# Patient Record
Sex: FEMALE | Race: WHITE | NOT HISPANIC OR LATINO | URBAN - METROPOLITAN AREA
[De-identification: names, ages, dates, MRNs, and addresses within clinical notes are randomized per-mention and may not be internally consistent; named-entity substitution may affect disease eponyms.]

---

## 2024-11-06 ENCOUNTER — INPATIENT (INPATIENT)
Facility: HOSPITAL | Age: 34
LOS: 1 days | Discharge: HOME | DRG: 951 | End: 2024-11-08
Attending: OBSTETRICS & GYNECOLOGY | Admitting: OBSTETRICS & GYNECOLOGY
Payer: COMMERCIAL

## 2024-11-06 VITALS
RESPIRATION RATE: 18 BRPM | SYSTOLIC BLOOD PRESSURE: 140 MMHG | DIASTOLIC BLOOD PRESSURE: 88 MMHG | TEMPERATURE: 98 F | HEART RATE: 72 BPM

## 2024-11-06 DIAGNOSIS — Z34.80 ENCOUNTER FOR SUPERVISION OF OTHER NORMAL PREGNANCY, UNSPECIFIED TRIMESTER: ICD-10-CM

## 2024-11-06 DIAGNOSIS — O26.899 OTHER SPECIFIED PREGNANCY RELATED CONDITIONS, UNSPECIFIED TRIMESTER: ICD-10-CM

## 2024-11-06 DIAGNOSIS — Z98.890 OTHER SPECIFIED POSTPROCEDURAL STATES: Chronic | ICD-10-CM

## 2024-11-06 LAB
ALBUMIN SERPL ELPH-MCNC: 3 G/DL — LOW (ref 3.3–5)
ALP SERPL-CCNC: 150 U/L — HIGH (ref 40–120)
ALT FLD-CCNC: 25 U/L — SIGNIFICANT CHANGE UP (ref 10–45)
ANION GAP SERPL CALC-SCNC: 9 MMOL/L — SIGNIFICANT CHANGE UP (ref 5–17)
AST SERPL-CCNC: 58 U/L — HIGH (ref 10–40)
BASOPHILS # BLD AUTO: 0.03 K/UL — SIGNIFICANT CHANGE UP (ref 0–0.2)
BASOPHILS # BLD AUTO: 0.04 K/UL — SIGNIFICANT CHANGE UP (ref 0–0.2)
BASOPHILS NFR BLD AUTO: 0.2 % — SIGNIFICANT CHANGE UP (ref 0–2)
BASOPHILS NFR BLD AUTO: 0.3 % — SIGNIFICANT CHANGE UP (ref 0–2)
BILIRUB SERPL-MCNC: <0.1 MG/DL — LOW (ref 0.2–1.2)
BLD GP AB SCN SERPL QL: NEGATIVE — SIGNIFICANT CHANGE UP
BUN SERPL-MCNC: 11 MG/DL — SIGNIFICANT CHANGE UP (ref 7–23)
CALCIUM SERPL-MCNC: 9.3 MG/DL — SIGNIFICANT CHANGE UP (ref 8.4–10.5)
CHLORIDE SERPL-SCNC: 107 MMOL/L — SIGNIFICANT CHANGE UP (ref 96–108)
CO2 SERPL-SCNC: 22 MMOL/L — SIGNIFICANT CHANGE UP (ref 22–31)
CREAT SERPL-MCNC: 0.67 MG/DL — SIGNIFICANT CHANGE UP (ref 0.5–1.3)
EGFR: 118 ML/MIN/1.73M2 — SIGNIFICANT CHANGE UP
EOSINOPHIL # BLD AUTO: 0 K/UL — SIGNIFICANT CHANGE UP (ref 0–0.5)
EOSINOPHIL # BLD AUTO: 0.08 K/UL — SIGNIFICANT CHANGE UP (ref 0–0.5)
EOSINOPHIL NFR BLD AUTO: 0 % — SIGNIFICANT CHANGE UP (ref 0–6)
EOSINOPHIL NFR BLD AUTO: 0.7 % — SIGNIFICANT CHANGE UP (ref 0–6)
GLUCOSE SERPL-MCNC: 107 MG/DL — HIGH (ref 70–99)
HCT VFR BLD CALC: 36.9 % — SIGNIFICANT CHANGE UP (ref 34.5–45)
HCT VFR BLD CALC: 37.8 % — SIGNIFICANT CHANGE UP (ref 34.5–45)
HGB BLD-MCNC: 12.4 G/DL — SIGNIFICANT CHANGE UP (ref 11.5–15.5)
HGB BLD-MCNC: 12.5 G/DL — SIGNIFICANT CHANGE UP (ref 11.5–15.5)
IMM GRANULOCYTES NFR BLD AUTO: 0.7 % — SIGNIFICANT CHANGE UP (ref 0–0.9)
IMM GRANULOCYTES NFR BLD AUTO: 0.8 % — SIGNIFICANT CHANGE UP (ref 0–0.9)
LDH SERPL L TO P-CCNC: 232 U/L — SIGNIFICANT CHANGE UP (ref 50–242)
LYMPHOCYTES # BLD AUTO: 0.95 K/UL — LOW (ref 1–3.3)
LYMPHOCYTES # BLD AUTO: 1.47 K/UL — SIGNIFICANT CHANGE UP (ref 1–3.3)
LYMPHOCYTES # BLD AUTO: 13.7 % — SIGNIFICANT CHANGE UP (ref 13–44)
LYMPHOCYTES # BLD AUTO: 4.5 % — LOW (ref 13–44)
MAGNESIUM SERPL-MCNC: 5.7 MG/DL — HIGH (ref 1.6–2.6)
MAGNESIUM SERPL-MCNC: 6.1 MG/DL — HIGH (ref 1.6–2.6)
MCHC RBC-ENTMCNC: 29.3 PG — SIGNIFICANT CHANGE UP (ref 27–34)
MCHC RBC-ENTMCNC: 30.1 PG — SIGNIFICANT CHANGE UP (ref 27–34)
MCHC RBC-ENTMCNC: 33.1 G/DL — SIGNIFICANT CHANGE UP (ref 32–36)
MCHC RBC-ENTMCNC: 33.6 G/DL — SIGNIFICANT CHANGE UP (ref 32–36)
MCV RBC AUTO: 88.7 FL — SIGNIFICANT CHANGE UP (ref 80–100)
MCV RBC AUTO: 89.6 FL — SIGNIFICANT CHANGE UP (ref 80–100)
MONOCYTES # BLD AUTO: 0.68 K/UL — SIGNIFICANT CHANGE UP (ref 0–0.9)
MONOCYTES # BLD AUTO: 0.72 K/UL — SIGNIFICANT CHANGE UP (ref 0–0.9)
MONOCYTES NFR BLD AUTO: 3.2 % — SIGNIFICANT CHANGE UP (ref 2–14)
MONOCYTES NFR BLD AUTO: 6.7 % — SIGNIFICANT CHANGE UP (ref 2–14)
NEUTROPHILS # BLD AUTO: 19.32 K/UL — HIGH (ref 1.8–7.4)
NEUTROPHILS # BLD AUTO: 8.38 K/UL — HIGH (ref 1.8–7.4)
NEUTROPHILS NFR BLD AUTO: 77.9 % — HIGH (ref 43–77)
NEUTROPHILS NFR BLD AUTO: 91.3 % — HIGH (ref 43–77)
NRBC # BLD: 0 /100 WBCS — SIGNIFICANT CHANGE UP (ref 0–0)
NRBC # BLD: 0 /100 WBCS — SIGNIFICANT CHANGE UP (ref 0–0)
PLATELET # BLD AUTO: 189 K/UL — SIGNIFICANT CHANGE UP (ref 150–400)
PLATELET # BLD AUTO: 209 K/UL — SIGNIFICANT CHANGE UP (ref 150–400)
POTASSIUM SERPL-MCNC: 5.2 MMOL/L — SIGNIFICANT CHANGE UP (ref 3.5–5.3)
POTASSIUM SERPL-SCNC: 5.2 MMOL/L — SIGNIFICANT CHANGE UP (ref 3.5–5.3)
PROT SERPL-MCNC: 5.9 G/DL — LOW (ref 6–8.3)
RBC # BLD: 4.12 M/UL — SIGNIFICANT CHANGE UP (ref 3.8–5.2)
RBC # BLD: 4.26 M/UL — SIGNIFICANT CHANGE UP (ref 3.8–5.2)
RBC # FLD: 12.8 % — SIGNIFICANT CHANGE UP (ref 10.3–14.5)
RBC # FLD: 13 % — SIGNIFICANT CHANGE UP (ref 10.3–14.5)
RH IG SCN BLD-IMP: POSITIVE — SIGNIFICANT CHANGE UP
SODIUM SERPL-SCNC: 138 MMOL/L — SIGNIFICANT CHANGE UP (ref 135–145)
T PALLIDUM AB TITR SER: NEGATIVE — SIGNIFICANT CHANGE UP
URATE SERPL-MCNC: 4.9 MG/DL — SIGNIFICANT CHANGE UP (ref 2.5–7)
WBC # BLD: 10.76 K/UL — HIGH (ref 3.8–10.5)
WBC # BLD: 21.15 K/UL — HIGH (ref 3.8–10.5)
WBC # FLD AUTO: 10.76 K/UL — HIGH (ref 3.8–10.5)
WBC # FLD AUTO: 21.15 K/UL — HIGH (ref 3.8–10.5)

## 2024-11-06 RX ORDER — ESCITALOPRAM 10 MG/1
5 TABLET, FILM COATED ORAL DAILY
Refills: 0 | Status: DISCONTINUED | OUTPATIENT
Start: 2024-11-06 | End: 2024-11-08

## 2024-11-06 RX ORDER — MAGNESIUM SULFATE IN 0.9% NACL 2 G/50 ML
4 INTRAVENOUS SOLUTION, PIGGYBACK (ML) INTRAVENOUS ONCE
Refills: 0 | Status: DISCONTINUED | OUTPATIENT
Start: 2024-11-06 | End: 2024-11-06

## 2024-11-06 RX ORDER — MAGNESIUM SULFATE IN 0.9% NACL 2 G/50 ML
4 INTRAVENOUS SOLUTION, PIGGYBACK (ML) INTRAVENOUS ONCE
Refills: 0 | Status: COMPLETED | OUTPATIENT
Start: 2024-11-06 | End: 2024-11-06

## 2024-11-06 RX ORDER — CITRIC ACID/SODIUM CITRATE 334-500MG
15 SOLUTION, ORAL ORAL EVERY 6 HOURS
Refills: 0 | Status: DISCONTINUED | OUTPATIENT
Start: 2024-11-06 | End: 2024-11-06

## 2024-11-06 RX ORDER — MAGNESIUM HYDROXIDE 1200 MG/15ML
30 SUSPENSION ORAL
Refills: 0 | Status: DISCONTINUED | OUTPATIENT
Start: 2024-11-06 | End: 2024-11-08

## 2024-11-06 RX ORDER — MORPHINE SULFATE 30 MG/1
2 TABLET, EXTENDED RELEASE ORAL ONCE
Refills: 0 | Status: DISCONTINUED | OUTPATIENT
Start: 2024-11-06 | End: 2024-11-07

## 2024-11-06 RX ORDER — DEXTROAMPHETAMINE SACCHARATE, AMPHETAMINE ASPARTATE, DEXTROAMPHETAMINE SULFATE, AND AMPHETAMINE SULFATE 3.125; 3.125; 3.125; 3.125 MG/1; MG/1; MG/1; MG/1
5 TABLET ORAL
Refills: 0 | Status: DISCONTINUED | OUTPATIENT
Start: 2024-11-06 | End: 2024-11-06

## 2024-11-06 RX ORDER — MAGNESIUM SULFATE IN 0.9% NACL 2 G/50 ML
2 INTRAVENOUS SOLUTION, PIGGYBACK (ML) INTRAVENOUS
Qty: 40 | Refills: 0 | Status: DISCONTINUED | OUTPATIENT
Start: 2024-11-06 | End: 2024-11-06

## 2024-11-06 RX ORDER — DEXAMETHASONE 1.5 MG 1.5 MG/1
4 TABLET ORAL EVERY 6 HOURS
Refills: 0 | Status: DISCONTINUED | OUTPATIENT
Start: 2024-11-06 | End: 2024-11-07

## 2024-11-06 RX ORDER — HEPARIN SODIUM 10000 [USP'U]/ML
5000 INJECTION INTRAVENOUS; SUBCUTANEOUS EVERY 12 HOURS
Refills: 0 | Status: DISCONTINUED | OUTPATIENT
Start: 2024-11-06 | End: 2024-11-08

## 2024-11-06 RX ORDER — OXYCODONE HYDROCHLORIDE 30 MG/1
5 TABLET ORAL
Refills: 0 | Status: COMPLETED | OUTPATIENT
Start: 2024-11-06 | End: 2024-11-13

## 2024-11-06 RX ORDER — OXYTOCIN IN D5W-0.2% SODIUM CL 15/250 ML
42 PLASTIC BAG, INJECTION (ML) INTRAVENOUS
Qty: 30 | Refills: 0 | Status: DISCONTINUED | OUTPATIENT
Start: 2024-11-06 | End: 2024-11-08

## 2024-11-06 RX ORDER — IBUPROFEN 200 MG
600 TABLET ORAL EVERY 6 HOURS
Refills: 0 | Status: COMPLETED | OUTPATIENT
Start: 2024-11-06 | End: 2025-10-05

## 2024-11-06 RX ORDER — MAGNESIUM SULFATE IN 0.9% NACL 2 G/50 ML
2 INTRAVENOUS SOLUTION, PIGGYBACK (ML) INTRAVENOUS
Qty: 40 | Refills: 0 | Status: DISCONTINUED | OUTPATIENT
Start: 2024-11-06 | End: 2024-11-07

## 2024-11-06 RX ORDER — NALBUPHINE HCL 100 %
2.5 POWDER (GRAM) MISCELLANEOUS EVERY 6 HOURS
Refills: 0 | Status: DISCONTINUED | OUTPATIENT
Start: 2024-11-06 | End: 2024-11-07

## 2024-11-06 RX ORDER — CHLORHEXIDINE GLUCONATE 40 MG/ML
1 SOLUTION TOPICAL DAILY
Refills: 0 | Status: DISCONTINUED | OUTPATIENT
Start: 2024-11-06 | End: 2024-11-06

## 2024-11-06 RX ORDER — AMPICILLIN 2 G/1
2 INJECTION, POWDER, FOR SOLUTION INTRAVENOUS ONCE
Refills: 0 | Status: DISCONTINUED | OUTPATIENT
Start: 2024-11-06 | End: 2024-11-06

## 2024-11-06 RX ORDER — MODIFIED LANOLIN
1 OINTMENT (GRAM) TOPICAL EVERY 6 HOURS
Refills: 0 | Status: DISCONTINUED | OUTPATIENT
Start: 2024-11-06 | End: 2024-11-08

## 2024-11-06 RX ORDER — NALOXONE HYDROCHLORIDE 1 MG/ML
0.1 INJECTION, SOLUTION INTRAMUSCULAR; INTRAVENOUS; SUBCUTANEOUS
Refills: 0 | Status: DISCONTINUED | OUTPATIENT
Start: 2024-11-06 | End: 2024-11-07

## 2024-11-06 RX ORDER — DIPHENHYDRAMINE HCL 12.5MG/5ML
25 ELIXIR ORAL EVERY 6 HOURS
Refills: 0 | Status: DISCONTINUED | OUTPATIENT
Start: 2024-11-06 | End: 2024-11-08

## 2024-11-06 RX ORDER — ONDANSETRON HYDROCHLORIDE 2 MG/ML
4 INJECTION, SOLUTION INTRAMUSCULAR; INTRAVENOUS EVERY 6 HOURS
Refills: 0 | Status: DISCONTINUED | OUTPATIENT
Start: 2024-11-06 | End: 2024-11-07

## 2024-11-06 RX ORDER — CLOSTRIDIUM TETANI TOXOID ANTIGEN (FORMALDEHYDE INACTIVATED), CORYNEBACTERIUM DIPHTHERIAE TOXOID ANTIGEN (FORMALDEHYDE INACTIVATED), BORDETELLA PERTUSSIS TOXOID ANTIGEN (GLUTARALDEHYDE INACTIVATED), BORDETELLA PERTUSSIS FILAMENTOUS HEMAGGLUTININ ANTIGEN (FORMALDEHYDE INACTIVATED), BORDETELLA PERTUSSIS PERTACTIN ANTIGEN, AND BORDETELLA PERTUSSIS FIMBRIAE 2/3 ANTIGEN 5; 2; 2.5; 5; 3; 5 [LF]/.5ML; [LF]/.5ML; UG/.5ML; UG/.5ML; UG/.5ML; UG/.5ML
0.5 INJECTION, SUSPENSION INTRAMUSCULAR ONCE
Refills: 0 | Status: DISCONTINUED | OUTPATIENT
Start: 2024-11-06 | End: 2024-11-08

## 2024-11-06 RX ORDER — OXYTOCIN IN D5W-0.2% SODIUM CL 15/250 ML
167 PLASTIC BAG, INJECTION (ML) INTRAVENOUS
Qty: 30 | Refills: 0 | Status: DISCONTINUED | OUTPATIENT
Start: 2024-11-06 | End: 2024-11-06

## 2024-11-06 RX ORDER — SIMETHICONE 80 MG/1
80 TABLET, CHEWABLE ORAL EVERY 4 HOURS
Refills: 0 | Status: DISCONTINUED | OUTPATIENT
Start: 2024-11-06 | End: 2024-11-08

## 2024-11-06 RX ORDER — ACETAMINOPHEN 500 MG
975 TABLET ORAL
Refills: 0 | Status: DISCONTINUED | OUTPATIENT
Start: 2024-11-06 | End: 2024-11-08

## 2024-11-06 RX ORDER — AMPICILLIN 2 G/1
1 INJECTION, POWDER, FOR SOLUTION INTRAVENOUS EVERY 4 HOURS
Refills: 0 | Status: DISCONTINUED | OUTPATIENT
Start: 2024-11-06 | End: 2024-11-06

## 2024-11-06 RX ORDER — NIFEDIPINE 90 MG
30 TABLET, EXTENDED RELEASE 24 HR ORAL DAILY
Refills: 0 | Status: DISCONTINUED | OUTPATIENT
Start: 2024-11-06 | End: 2024-11-08

## 2024-11-06 RX ORDER — KETOROLAC TROMETHAMINE 30 MG/ML
30 INJECTION INTRAMUSCULAR; INTRAVENOUS EVERY 6 HOURS
Refills: 0 | Status: COMPLETED | OUTPATIENT
Start: 2024-11-06 | End: 2024-11-07

## 2024-11-06 RX ORDER — LABETALOL HCL 200 MG
20 TABLET ORAL ONCE
Refills: 0 | Status: COMPLETED | OUTPATIENT
Start: 2024-11-06 | End: 2024-11-06

## 2024-11-06 RX ORDER — OXYCODONE HYDROCHLORIDE 30 MG/1
5 TABLET ORAL ONCE
Refills: 0 | Status: DISCONTINUED | OUTPATIENT
Start: 2024-11-06 | End: 2024-11-08

## 2024-11-06 RX ORDER — ACETAMINOPHEN 500 MG
1000 TABLET ORAL ONCE
Refills: 0 | Status: DISCONTINUED | OUTPATIENT
Start: 2024-11-06 | End: 2024-11-08

## 2024-11-06 RX ADMIN — Medication 50 GM/HR: at 09:26

## 2024-11-06 RX ADMIN — KETOROLAC TROMETHAMINE 30 MILLIGRAM(S): 30 INJECTION INTRAMUSCULAR; INTRAVENOUS at 20:58

## 2024-11-06 RX ADMIN — Medication 975 MILLIGRAM(S): at 23:10

## 2024-11-06 RX ADMIN — KETOROLAC TROMETHAMINE 30 MILLIGRAM(S): 30 INJECTION INTRAMUSCULAR; INTRAVENOUS at 21:45

## 2024-11-06 RX ADMIN — Medication 30 MILLIGRAM(S): at 11:11

## 2024-11-06 RX ADMIN — HEPARIN SODIUM 5000 UNIT(S): 10000 INJECTION INTRAVENOUS; SUBCUTANEOUS at 17:36

## 2024-11-06 RX ADMIN — Medication 50 MILLILITER(S): at 12:00

## 2024-11-06 RX ADMIN — Medication 975 MILLIGRAM(S): at 17:00

## 2024-11-06 RX ADMIN — KETOROLAC TROMETHAMINE 30 MILLIGRAM(S): 30 INJECTION INTRAMUSCULAR; INTRAVENOUS at 13:36

## 2024-11-06 RX ADMIN — Medication 975 MILLIGRAM(S): at 11:30

## 2024-11-06 RX ADMIN — Medication 975 MILLIGRAM(S): at 16:26

## 2024-11-06 RX ADMIN — ESCITALOPRAM 5 MILLIGRAM(S): 10 TABLET, FILM COATED ORAL at 11:27

## 2024-11-06 RX ADMIN — Medication 20 MILLIGRAM(S): at 08:59

## 2024-11-06 RX ADMIN — Medication 300 GRAM(S): at 09:03

## 2024-11-06 RX ADMIN — KETOROLAC TROMETHAMINE 30 MILLIGRAM(S): 30 INJECTION INTRAMUSCULAR; INTRAVENOUS at 14:00

## 2024-11-06 NOTE — OB RN DELIVERY SUMMARY - NS_SEPSISRSKCALC_OBGYN_ALL_OB_FT
EOS calculated successfully. EOS Risk Factor: 0.5/1000 live births ([facility] national incidence); GA=39w;Temp=98.24; ROM=5.1; GBS='Positive'; Antibiotics='No antibiotics or any antibiotics < 2 hrs prior to birth'

## 2024-11-06 NOTE — OB RN DELIVERY SUMMARY - AMNIOTIC FLUID AMOUNT, LABOR
within normal limits
Plan: Regimen as discussed below.\\n\\Tigist the morning:\\n-Wash face with BPO 5% wash. Leave on the face for 30 seconds before rinsing off.\\n-Apply topical clindamycin to affected areas. \\n-Apply non-comedogenic moisturizer with SPF 30.\\n\\[place] night:\\n-Wash face with gentle non-comedogenic cleanser. \\n-Apply a pea-sized amount of retinoid to the entire face.\\n-Apply gentle non-comedogenic moisturizer.\\n-Take spironolactone\\n\\nSTART:\\n-BPO 4-5% wash OTC, Leave on the face for 30 seconds before rinsing off.\\n\\n-clindamycin 1 % lotion, Apply to the face QAM after washing with BPO wash\\n\\n-tretinoin 0.025 % topical cream, Apply one pea sized amount to full face 2 nights per week, then increase to nightly as tolerated.\\n\\n-spironolactone 50 mg tablet, Take 1 tab po QHS with full glass of water x 1 week. Then increase to 2 tabs QHS as tolerated.\\n\\nPatient to fup in 3 months or sooner if needed. Discussed increasing spironolactone at next visit if needed.
Detail Level: Zone

## 2024-11-06 NOTE — DISCHARGE NOTE OB - PATIENT PORTAL LINK FT
You can access the FollowMyHealth Patient Portal offered by [facility] by registering at the following website: [email or web address]. By joining [employer]’s FollowMyHealth portal, you will also be able to view your health information using other applications (apps) compatible with our system.

## 2024-11-06 NOTE — DISCHARGE NOTE OB - ADDITIONAL INSTRUCTIONS
Please follow up with your doctor in 6 weeks for your postpartum visit and 1 week for your incision check. Please follow up with your doctor in 6 weeks for your postpartum visit and 2 weeks for your incision check. Please follow up with cardio OB within 1 week. If you are unable to get an appointment please call your OB to get in with them.

## 2024-11-06 NOTE — DISCHARGE NOTE OB - MEDICATION SUMMARY - MEDICATIONS TO TAKE
I will START or STAY ON the medications listed below when I get home from the hospital:    ibuprofen 600 mg oral tablet  -- 1 tab(s) by mouth every 6 hours  -- Indication: For pain     acetaminophen 325 mg oral tablet  -- 3 tab(s) by mouth every 8 hours  -- Indication: For pain     oxyCODONE 5 mg oral tablet  -- 1 tab(s) by mouth every 4 to 6 hours as needed for Moderate to Severe Pain (4-10)  -- Indication: For pain    NIFEdipine 30 mg oral tablet, extended release  -- 1 tab(s) by mouth once a day  -- Indication: For  preeclampsia- hold for BP <110/70

## 2024-11-06 NOTE — OB PROVIDER H&P - NSHPPHYSICALEXAM_GEN_ALL_CORE
Gen: resting in bed  Pulm: breathing comfortably on room air  Abd: gravid  Ext: moving all ext w ease    VE: 4.5/70/-3  Sono: vtx  Tracing: cat 1

## 2024-11-06 NOTE — DISCHARGE NOTE OB - HOSPITAL COURSE
s/p emergent pCS for failed vacuum for terminal bradycardia. LTCS uncomplicated, All postpartum milestones met. Stable for discharge s/p emergent pCS for failed vacuum for terminal bradycardia. LTCS uncomplicated, Pt developed preeclampsia with severe features and was treated with magnesium. Bps improved on procardia. All postpartum milestones met. Stable for discharge on POD2

## 2024-11-06 NOTE — PRE-ANESTHESIA EVALUATION ADULT - NSATTENDATTESTRD_GEN_ALL_CORE
Single balloon inflation. The first balloon was inserted into the left anterior descending and middle left anterior descending.Max pressure = 14 lydia. Total duration = 14 seconds.
The patient has been re-examined and I agree with the above assessment or I updated with my findings.
The patient has been re-examined and I agree with the above assessment or I updated with my findings.

## 2024-11-06 NOTE — OB NEONATOLOGY/PEDIATRICIAN DELIVERY SUMMARY - NSPEDSNEONOTESA_OBGYN_ALL_OB_FT
Peds requested at delivery for fetal bradycardia, and vacuum (3 pulls, 3 pop-offs). Failed vacuum, urgent CS under general anesthesia. Baby boy, born on  (04:36) at 39.0 wks via primary CS to a 35 y/o , O+ blood type mother. Maternal history of anx/dep (lexapro), ADHD (adderall), bilateral fasciotomy. No significant prenatal history. HIV neg, HBsAg pending, Rubella pending, RPR NR, GBS +, untreated. SROM at 23:30 (~5 HSR) with clear fluids. Baby was w/d/s/s with APGARS of 8/9. Mom would like to breastfeed, consents Hep B and consents circ. Tmax: 36.8C. EOS: 0.14.    Physical Exam:  Gen: NAD, +grimace  HEENT: anterior fontanel open soft and flat, no cleft lip/palate, ears normal set, no ear pits or tags. no lesions in mouth/throat, nares clinically patent  Resp: no increased work of breathing, good air entry b/l, clear to auscultation bilaterally  Cardio: Normal S1/S2, regular rate and rhythm, no murmurs, rubs or gallops  Abd: soft, non tender, non distended, + bowel sounds, umbilical cord with 3 vessels  Neuro: +grasp/suck/kar, normal tone  Extremities: negative kolb and ortolani, moving all extremities, full range of motion x 4, no crepitus  Skin: pink, warm  Genitals: Normal male anatomy, testicles palpable in scrotum b/l, Chandler 1, anus patent

## 2024-11-06 NOTE — OB PROVIDER LABOR PROGRESS NOTE - ASSESSMENT
33 yo  @39 w admitted in labor.    - GBS Positive: Ampicillin  - EFM / TOCO : Resuscitative measures PRN  - Epi PRN: anesthesia called  - Expectant Management  - Expect     CHAYA Jenkins PA-C

## 2024-11-06 NOTE — OB PROVIDER DELIVERY SUMMARY - NSSELHIDDEN_OBGYN_ALL_OB_FT
[NS_DeliveryAttending1_OBGYN_ALL_OB_FT:[id number]],[NS_DeliveryAssist1_OBGYN_ALL_OB_FT:[id number]=]

## 2024-11-06 NOTE — OB RN DELIVERY SUMMARY - NSSELHIDDEN_OBGYN_ALL_OB_FT
[NS_DeliveryAttending1_OBGYN_ALL_OB_FT:[id number]],[NS_DeliveryAssist1_OBGYN_ALL_OB_FT:[id number]=],[NS_DeliveryRN_OBGYN_ALL_OB_FT:[id number]=]

## 2024-11-06 NOTE — OB RN PATIENT PROFILE - COMFORT/ACCEPTABLE PAIN LEVEL (0-10)
Patient alert and oriented on the phone, speaking in full sentences without noticeable dyspnea,   Patient denies pedal edema or lower extremity edema - did have pleurisy when young, only sleeps 3 hours a night due to unable to catch breath. Anytime lays down has SOB whether on side or back makes no difference, no noisy breathing when laying down. (Patient is overweight by snap shot).    Telephone Triage Protocols for Nurses, fifth edition, Yumiko Lincoln. Breathing Problems page 101 to 103 states seek emergency care now - patient declines states feels fine as long as does not lay down, sleeps sitting up and does have an appointment 9/27/2018 at 9:20 am.     ADMITS: inability to breathe lying down or need to sit up to breathe.     DENIES: chest pain, blue lips or tongue, clammy skin, feeling of suffocation, frothy pink or copious white sputum, altered mental status, severe SOB with sudden onset, history of pulmonary embolus, blood clots or lung collapse, severe wheezing and history of asthma, inability to speak, drooling and inability to swallow, dyspnea after inhaling smoke, flames or fumes, difficulty taking a deep breath due to severe pain, severe SOB, wheezing or noisy breathing started within past 2 hours, recent trauma, surgery, inhallation of a foreign body, exposure to something that previously caused significant reaction, speaking in short words.      Routing to PCP to review and advise.    Crista Kay RN  [facility]             5

## 2024-11-06 NOTE — OB PROVIDER LABOR PROGRESS NOTE - NS_SUBJECTIVE/OBJECTIVE_OBGYN_ALL_OB_FT
Patient seen and evaluated at bedside for increasing pain.    ICU Vital Signs Last 24 Hrs  T(C): 36.8 (06 Nov 2024 03:24), Max: 36.8 (06 Nov 2024 02:34)  T(F): 98.2 (06 Nov 2024 02:43), Max: 98.24 (06 Nov 2024 02:34)  HR: 88 (06 Nov 2024 03:25) (68 - 88)  BP: 130/78 (06 Nov 2024 03:24) (130/78 - 140/88)  RR: 18 (06 Nov 2024 03:24) (18 - 18)  SpO2: 96% (06 Nov 2024 03:25) (95% - 100%)    O2 Parameters below as of 06 Nov 2024 02:43  Patient On (Oxygen Delivery Method): room air

## 2024-11-06 NOTE — OB RN INTRAOPERATIVE NOTE - NS_ADDITIONALPROCINFO1_OBGYN_ALL_OB_FT
No edema,  no murmurs,  regular rate and rhythm.
Urine = 20mL output in OR  EBL = 400mL as per MD Mcgregor

## 2024-11-06 NOTE — OB PROVIDER H&P - PRETERM DELIVERIES, OB PROFILE
For Your Well Being: Upper respiratory Infections - Adult    Colds and upper respiratory infections often last 7 to 10 days. They are caused by viruses. Antibiotics are not an effective treatment for viruses.    Symptoms of upper respiratory infections or colds include:  · Sneezing  · Cough  · Runny nose  · Sore throat  · Loss of appetite  · General body aches  · Fatigue  · Fever    Your symptoms may be managed at home in this way:  · Rest at home if there is a fever.  · Drink plenty of liquids.  · For fever, headache, sore throat and body aches, take acetaminophen (Tylenol) or ibuprofen (Advil/Nuprin) as directed.  · For sore throat, try warm water gargles, throat lozenges and cold fluids.  · For stuffy nose, a room humidifier or an over-the -counter nasal decongestant (pseudoephedrine 30mg, 1 tab every 6 hours as needed) may be helpful. Follow label directions.  · Do not smoke or be exposed to cigarette/ pipe smoke.  · If cough becomes bothersome, begin Mucinex DM, 1-2 tabs every 12 hours as needed.  · Cover coughs and wash hands frequently for infection control.    Fever  · Take your temperature every 4 hours during the fever.  · Normal temperatures are:   -oral 98.6   -rectal 99.6   -underarm 97.6 degrees F (least accurate)  · You can treat your fever with Tylenol or ibuprofen (Advil/Nuprin/Motrin) when your temperature is:   -oral 101 degrees F or higher   - rectal 102 degrees F or higher   - underarm 100 degrees or higher  · Follow the instructions on the label.  · Acetametophen (Tylenol) rectal suppositories can be used for people who are vomitting.  · Dress lightly to allow body heat to escape. If shivering, cover with a light blanket until shivering stops. Maintain normal room temp.  · Take frequent amounts of cool liquids.          Middle Ear Infection (Adult)  You have an infection of the middle ear (the space behind the eardrum). This is also called acute otitis media (AOM). Sometimes it is caused by the  common cold. This is because congestion can block the internal passage (eustachian tube) that drains fluid from the middle ear. When the middle ear fills with fluid, bacteria can grow there and cause an infection. Oral antibiotics are used to treat this illness, not ear drops. Symptoms usually start to improve within 1 to 2 days of treatment.    Home care  The following are general care guidelines:  · Finish all of the antibiotic medicine given, even though you may feel better after the first few days.  · You may use acetaminophen or ibuprofen to control pain, unless something else was prescribed. [NOTE: If you have chronic liver or kidney disease or have ever had a stomach ulcer or GI bleeding, talk with your doctor before using these medicines.] Do not give aspirin to anyone under 18 years of age who has a fever. It may cause severe liver damage.  Follow-up care  Follow up with your doctor in 2 weeks if all symptoms have not gotten better, or if hearing doesn't go back to normal within 1 month.  When to seek medical care  Get prompt medical attention if any of the following occur:  · Ear pain gets worse or does not improve after 3 days of treatment  · Unusual drowsiness or confusion  · Neck pain, stiff neck, or headache  · Fluid or blood draining from the ear canal  · Fever of 100.4°F (38°C) or higher after 3 days of antibiotics, or as directed by your health care provider  · Convulsion (seizure)  © 0189-2872 [employer]. [address] [place] [address], [place] [address]. All rights reserved. This information is not intended as a substitute for professional medical care. Always follow your healthcare professional's instructions.         0

## 2024-11-06 NOTE — OB PROVIDER H&P - NSLOWPPHRISK_OBGYN_A_OB
No previous uterine incision/Romo Pregnancy/Less than or equal to 4 previous vaginal births/No known bleeding disorder/No history of postpartum hemorrhage/No other PPH risks indicated

## 2024-11-06 NOTE — DISCHARGE NOTE OB - PLAN OF CARE
If you experience any of the following, please notify your provider:  -fever >100.4F  -increased vaginal bleeding or clotting (saturating a pad an hour)  -foul smelling discharge or bloody discharge from your incision site  -severe abdominal, vaginal, or rectal pain   -persistent headache or vision changes  -swollen areas on your legs that are red, hot, or painful   -swollen, hot, painful areas and/or streaks on your breasts  -cracked or bleeding nipples  -mood swings, depression, or crying spells lasting more than 3 days     Nothing in the vagina for 6 weeks (no douching, sex or tampons). No baths, you may shower.

## 2024-11-06 NOTE — OB RN INTRAOPERATIVE NOTE - NSSELHIDDEN_OBGYN_ALL_OB_FT
[NS_DeliveryAttending1_OBGYN_ALL_OB_FT:[id number]],[NS_DeliveryAssist1_OBGYN_ALL_OB_FT:[id number]],[NS_DeliveryRN_OBGYN_ALL_OB_FT:[id number]=]

## 2024-11-06 NOTE — OB PROVIDER H&P - HISTORY OF PRESENT ILLNESS
35 yo  @39w endorses water broke at 1130p and ctx that began shortly after, currently 2-3 min apart. -VB, +FM. Denies any other cb  GBS + EFW: 3500 g extrapolated from office sono    ObHx: denies c/b during this preg  GynHx: denies  PMSH: b/l LE fasciotomies  Med: Lexapro 5, Adderall 5  All: NKDA  Psych: hx of anxiety/ dep and ADHD  SH: denies  Willing to accept blood: yes

## 2024-11-06 NOTE — OB RN PATIENT PROFILE - NSSDOHHEADEN_OBGYN_A_OB
Orders Signed This Visit (1)                      HYDROmorphone (Dilaudid) 4 MG tablet             Â Indication: Chronic Pain  1/2-I po TID, prn  Eprescribe, Disp-20 tablet, R-0  For 30 days       Rx sent. Chart reviewed.  reviewed. no

## 2024-11-06 NOTE — OB PROVIDER DELIVERY SUMMARY - NSPROVIDERDELIVERYNOTE_OBGYN_ALL_OB_FT
fetus FD+2, attempted vacuum with two popoffs. given two popoffs patient bought to OR for emergent  section. given lack of analgesia as anesthesia had no time to load her, decision made to proceed with  section under general anesthesia  viable male infant, cephalic presentation, weight 3440g, APGARS 8/9  grossly normal uterus, b/l tubes and ovaries  hysterotomy closed in 1 layer with vicryl suture  rectus muscle reapproximated with chromic suture in a mattress fashion  patient to be on zosyn x 24 hours postpartum    400/1500/20    Dictation per Dr Hernández fetus FD+2, attempted vacuum with three popoffs. given three popoffs patient bought to OR for emergent  section. given lack of analgesia as anesthesia had no time to load her, decision made to proceed with  section under general anesthesia  viable male infant, cephalic presentation, weight 3440g, APGARS 8/9  grossly normal uterus, b/l tubes and ovaries  hysterotomy closed in 1 layer with vicryl suture  rectus muscle reapproximated with chromic suture in a mattress fashion  patient to be on zosyn x 24 hours postpartum    400/1500/20    Dictation per Dr Hernández fetus FD+2, attempted vacuum with three popoffs. given three popoffs patient bought to OR for emergent  section. given lack of analgesia as anesthesia had no time to load her, decision made to proceed with  section under general anesthesia  viable male infant, cephalic presentation, weight 3440g, APGARS 8/9  grossly normal uterus, b/l tubes and ovaries  hysterotomy closed in 1 layer with vicryl suture  rectus muscle reapproximated with chromic suture in a mattress fashion  patient to be on zosyn x 24 hours postpartum    400/1500/20    Dictation per Dr Hernández (Dictation# [id number])

## 2024-11-06 NOTE — OB PROVIDER H&P - ASSESSMENT
35 yo  @39w here in labor and SROM @2684p (). GBS +     Plan  1. Admit to LND. Routine Labs. IVF.  2. Expectant management  3. Fetus: cat 1 tracing. VTX. Continuous EFM. Sono. No concerns.  4. Prenatal issues: none  5. GBS + for amp   6. Pain: IV pain meds/epidural PRN    Patient discussed with attending physician, Dr. Meche Ames PGY1

## 2024-11-06 NOTE — DISCHARGE NOTE OB - CARE PLAN
Assessment and plan of treatment:	If you experience any of the following, please notify your provider:  -fever >100.4F  -increased vaginal bleeding or clotting (saturating a pad an hour)  -foul smelling discharge or bloody discharge from your incision site  -severe abdominal, vaginal, or rectal pain   -persistent headache or vision changes  -swollen areas on your legs that are red, hot, or painful   -swollen, hot, painful areas and/or streaks on your breasts  -cracked or bleeding nipples  -mood swings, depression, or crying spells lasting more than 3 days     Nothing in the vagina for 6 weeks (no douching, sex or tampons). No baths, you may shower.   Principal Discharge DX:	 delivery delivered  Assessment and plan of treatment:	If you experience any of the following, please notify your provider:  -fever >100.4F  -increased vaginal bleeding or clotting (saturating a pad an hour)  -foul smelling discharge or bloody discharge from your incision site  -severe abdominal, vaginal, or rectal pain   -persistent headache or vision changes  -swollen areas on your legs that are red, hot, or painful   -swollen, hot, painful areas and/or streaks on your breasts  -cracked or bleeding nipples  -mood swings, depression, or crying spells lasting more than 3 days     Nothing in the vagina for 6 weeks (no douching, sex or tampons). No baths, you may shower.   1

## 2024-11-06 NOTE — DISCHARGE NOTE OB - CARE PROVIDER_API CALL
Elizabeth Mcgregor  Obstetrics and Gynecology  [address], [address]  [place] [address]  Phone: [phone]  Fax: [phone]  Established Patient  Follow Up Time:

## 2024-11-06 NOTE — OB RN PATIENT PROFILE - FALL HARM RISK - UNIVERSAL INTERVENTIONS
Bed in lowest position, wheels locked, appropriate side rails in place/Call bell, personal items and telephone in reach/Instruct patient to call for assistance before getting out of bed or chair/Non-slip footwear when patient is out of bed/[place] to call system/Physically safe environment - no spills, clutter or unnecessary equipment/Purposeful Proactive Rounding/Room/bathroom lighting operational, light cord in reach

## 2024-11-06 NOTE — DISCHARGE NOTE OB - FINANCIAL ASSISTANCE
[facility] provides services at a reduced cost to those who are determined to be eligible through [facility]’s financial assistance program. Information regarding [facility]’s financial assistance program can be found by going to [email or web address] or by calling [phone].

## 2024-11-07 LAB
ALBUMIN SERPL ELPH-MCNC: 2.8 G/DL — LOW (ref 3.3–5)
ALP SERPL-CCNC: 128 U/L — HIGH (ref 40–120)
ALT FLD-CCNC: 28 U/L — SIGNIFICANT CHANGE UP (ref 10–45)
ANION GAP SERPL CALC-SCNC: 9 MMOL/L — SIGNIFICANT CHANGE UP (ref 5–17)
AST SERPL-CCNC: 68 U/L — HIGH (ref 10–40)
BASOPHILS # BLD AUTO: 0.03 K/UL — SIGNIFICANT CHANGE UP (ref 0–0.2)
BASOPHILS NFR BLD AUTO: 0.2 % — SIGNIFICANT CHANGE UP (ref 0–2)
BILIRUB SERPL-MCNC: <0.1 MG/DL — LOW (ref 0.2–1.2)
BUN SERPL-MCNC: 10 MG/DL — SIGNIFICANT CHANGE UP (ref 7–23)
CALCIUM SERPL-MCNC: 6.7 MG/DL — LOW (ref 8.4–10.5)
CHLORIDE SERPL-SCNC: 105 MMOL/L — SIGNIFICANT CHANGE UP (ref 96–108)
CO2 SERPL-SCNC: 25 MMOL/L — SIGNIFICANT CHANGE UP (ref 22–31)
CREAT SERPL-MCNC: 0.72 MG/DL — SIGNIFICANT CHANGE UP (ref 0.5–1.3)
EGFR: 112 ML/MIN/1.73M2 — SIGNIFICANT CHANGE UP
EOSINOPHIL # BLD AUTO: 0.05 K/UL — SIGNIFICANT CHANGE UP (ref 0–0.5)
EOSINOPHIL NFR BLD AUTO: 0.3 % — SIGNIFICANT CHANGE UP (ref 0–6)
GLUCOSE SERPL-MCNC: 103 MG/DL — HIGH (ref 70–99)
HCT VFR BLD CALC: 30.6 % — LOW (ref 34.5–45)
HGB BLD-MCNC: 10.2 G/DL — LOW (ref 11.5–15.5)
IMM GRANULOCYTES NFR BLD AUTO: 0.7 % — SIGNIFICANT CHANGE UP (ref 0–0.9)
LDH SERPL L TO P-CCNC: 297 U/L — HIGH (ref 50–242)
LYMPHOCYTES # BLD AUTO: 1.68 K/UL — SIGNIFICANT CHANGE UP (ref 1–3.3)
LYMPHOCYTES # BLD AUTO: 9.5 % — LOW (ref 13–44)
MAGNESIUM SERPL-MCNC: 6.1 MG/DL — HIGH (ref 1.6–2.6)
MCHC RBC-ENTMCNC: 29.7 PG — SIGNIFICANT CHANGE UP (ref 27–34)
MCHC RBC-ENTMCNC: 33.3 G/DL — SIGNIFICANT CHANGE UP (ref 32–36)
MCV RBC AUTO: 89.2 FL — SIGNIFICANT CHANGE UP (ref 80–100)
MONOCYTES # BLD AUTO: 0.87 K/UL — SIGNIFICANT CHANGE UP (ref 0–0.9)
MONOCYTES NFR BLD AUTO: 4.9 % — SIGNIFICANT CHANGE UP (ref 2–14)
NEUTROPHILS # BLD AUTO: 14.97 K/UL — HIGH (ref 1.8–7.4)
NEUTROPHILS NFR BLD AUTO: 84.4 % — HIGH (ref 43–77)
NRBC # BLD: 0 /100 WBCS — SIGNIFICANT CHANGE UP (ref 0–0)
PLATELET # BLD AUTO: 181 K/UL — SIGNIFICANT CHANGE UP (ref 150–400)
POTASSIUM SERPL-MCNC: 4.1 MMOL/L — SIGNIFICANT CHANGE UP (ref 3.5–5.3)
POTASSIUM SERPL-SCNC: 4.1 MMOL/L — SIGNIFICANT CHANGE UP (ref 3.5–5.3)
PROT SERPL-MCNC: 5.3 G/DL — LOW (ref 6–8.3)
RBC # BLD: 3.43 M/UL — LOW (ref 3.8–5.2)
RBC # FLD: 13.1 % — SIGNIFICANT CHANGE UP (ref 10.3–14.5)
SODIUM SERPL-SCNC: 139 MMOL/L — SIGNIFICANT CHANGE UP (ref 135–145)
URATE SERPL-MCNC: 5.3 MG/DL — SIGNIFICANT CHANGE UP (ref 2.5–7)
WBC # BLD: 17.72 K/UL — HIGH (ref 3.8–10.5)
WBC # FLD AUTO: 17.72 K/UL — HIGH (ref 3.8–10.5)

## 2024-11-07 RX ORDER — VALACYCLOVIR HYDROCHLORIDE 500 MG/1
1000 TABLET ORAL ONCE
Refills: 0 | Status: COMPLETED | OUTPATIENT
Start: 2024-11-07 | End: 2024-11-07

## 2024-11-07 RX ORDER — OXYCODONE HYDROCHLORIDE 30 MG/1
5 TABLET ORAL
Refills: 0 | Status: DISCONTINUED | OUTPATIENT
Start: 2024-11-07 | End: 2024-11-08

## 2024-11-07 RX ORDER — IBUPROFEN 200 MG
600 TABLET ORAL EVERY 6 HOURS
Refills: 0 | Status: DISCONTINUED | OUTPATIENT
Start: 2024-11-07 | End: 2024-11-08

## 2024-11-07 RX ADMIN — Medication 975 MILLIGRAM(S): at 23:12

## 2024-11-07 RX ADMIN — Medication 975 MILLIGRAM(S): at 12:04

## 2024-11-07 RX ADMIN — VALACYCLOVIR HYDROCHLORIDE 1000 MILLIGRAM(S): 500 TABLET ORAL at 03:36

## 2024-11-07 RX ADMIN — Medication 975 MILLIGRAM(S): at 17:34

## 2024-11-07 RX ADMIN — Medication 600 MILLIGRAM(S): at 10:00

## 2024-11-07 RX ADMIN — KETOROLAC TROMETHAMINE 30 MILLIGRAM(S): 30 INJECTION INTRAMUSCULAR; INTRAVENOUS at 02:38

## 2024-11-07 RX ADMIN — Medication 600 MILLIGRAM(S): at 21:48

## 2024-11-07 RX ADMIN — Medication 975 MILLIGRAM(S): at 13:00

## 2024-11-07 RX ADMIN — Medication 975 MILLIGRAM(S): at 05:57

## 2024-11-07 RX ADMIN — Medication 600 MILLIGRAM(S): at 09:00

## 2024-11-07 RX ADMIN — Medication 600 MILLIGRAM(S): at 15:27

## 2024-11-07 RX ADMIN — HEPARIN SODIUM 5000 UNIT(S): 10000 INJECTION INTRAVENOUS; SUBCUTANEOUS at 05:56

## 2024-11-07 RX ADMIN — KETOROLAC TROMETHAMINE 30 MILLIGRAM(S): 30 INJECTION INTRAMUSCULAR; INTRAVENOUS at 02:58

## 2024-11-07 RX ADMIN — HEPARIN SODIUM 5000 UNIT(S): 10000 INJECTION INTRAVENOUS; SUBCUTANEOUS at 17:35

## 2024-11-07 RX ADMIN — Medication 30 MILLIGRAM(S): at 12:04

## 2024-11-07 RX ADMIN — OXYCODONE HYDROCHLORIDE 5 MILLIGRAM(S): 30 TABLET ORAL at 15:56

## 2024-11-07 RX ADMIN — OXYCODONE HYDROCHLORIDE 5 MILLIGRAM(S): 30 TABLET ORAL at 17:00

## 2024-11-07 RX ADMIN — Medication 600 MILLIGRAM(S): at 20:48

## 2024-11-07 RX ADMIN — OXYCODONE HYDROCHLORIDE 5 MILLIGRAM(S): 30 TABLET ORAL at 23:10

## 2024-11-07 RX ADMIN — Medication 975 MILLIGRAM(S): at 00:37

## 2024-11-07 RX ADMIN — Medication 975 MILLIGRAM(S): at 18:11

## 2024-11-07 RX ADMIN — Medication 600 MILLIGRAM(S): at 14:26

## 2024-11-07 NOTE — PROGRESS NOTE ADULT - NSPROGADDITIONALINFOA_GEN_ALL_CORE
10.2   17.72 )-----------( 181      ( 07 Nov 2024 04:06 )             30.6   11-07    139  |  105  |  10  ----------------------------<  103[H]  4.1   |  25  |  0.72    Ca    6.7[L]      07 Nov 2024 04:06  Mg     6.1     11-07    TPro  5.3[L]  /  Alb  2.8[L]  /  TBili  <0.1[L]  /  DBili  x   /  AST  68[H]  /  ALT  28  /  AlkPhos  128[H]  11-07

## 2024-11-08 VITALS — DIASTOLIC BLOOD PRESSURE: 83 MMHG | HEART RATE: 79 BPM | SYSTOLIC BLOOD PRESSURE: 133 MMHG

## 2024-11-08 PROCEDURE — 86780 TREPONEMA PALLIDUM: CPT

## 2024-11-08 PROCEDURE — 59025 FETAL NON-STRESS TEST: CPT

## 2024-11-08 PROCEDURE — 86901 BLOOD TYPING SEROLOGIC RH(D): CPT

## 2024-11-08 PROCEDURE — 84550 ASSAY OF BLOOD/URIC ACID: CPT

## 2024-11-08 PROCEDURE — 80053 COMPREHEN METABOLIC PANEL: CPT

## 2024-11-08 PROCEDURE — 86900 BLOOD TYPING SEROLOGIC ABO: CPT

## 2024-11-08 PROCEDURE — 36415 COLL VENOUS BLD VENIPUNCTURE: CPT

## 2024-11-08 PROCEDURE — 59050 FETAL MONITOR W/REPORT: CPT

## 2024-11-08 PROCEDURE — 85025 COMPLETE CBC W/AUTO DIFF WBC: CPT

## 2024-11-08 PROCEDURE — 83615 LACTATE (LD) (LDH) ENZYME: CPT

## 2024-11-08 PROCEDURE — 83735 ASSAY OF MAGNESIUM: CPT

## 2024-11-08 PROCEDURE — 86850 RBC ANTIBODY SCREEN: CPT

## 2024-11-08 RX ORDER — NIFEDIPINE 90 MG
1 TABLET, EXTENDED RELEASE 24 HR ORAL
Qty: 0 | Refills: 0 | DISCHARGE
Start: 2024-11-08

## 2024-11-08 RX ORDER — OXYCODONE HYDROCHLORIDE 30 MG/1
1 TABLET ORAL
Qty: 0 | Refills: 0 | DISCHARGE
Start: 2024-11-08

## 2024-11-08 RX ORDER — IBUPROFEN 200 MG
1 TABLET ORAL
Qty: 0 | Refills: 0 | DISCHARGE
Start: 2024-11-08

## 2024-11-08 RX ORDER — ACETAMINOPHEN 500 MG
3 TABLET ORAL
Qty: 0 | Refills: 0 | DISCHARGE
Start: 2024-11-08

## 2024-11-08 RX ADMIN — Medication 30 MILLIGRAM(S): at 11:57

## 2024-11-08 RX ADMIN — Medication 600 MILLIGRAM(S): at 14:41

## 2024-11-08 RX ADMIN — Medication 975 MILLIGRAM(S): at 12:30

## 2024-11-08 RX ADMIN — Medication 600 MILLIGRAM(S): at 04:10

## 2024-11-08 RX ADMIN — Medication 975 MILLIGRAM(S): at 07:41

## 2024-11-08 RX ADMIN — Medication 975 MILLIGRAM(S): at 00:12

## 2024-11-08 RX ADMIN — ESCITALOPRAM 5 MILLIGRAM(S): 10 TABLET, FILM COATED ORAL at 11:57

## 2024-11-08 RX ADMIN — Medication 975 MILLIGRAM(S): at 11:57

## 2024-11-08 RX ADMIN — Medication 975 MILLIGRAM(S): at 06:41

## 2024-11-08 RX ADMIN — Medication 600 MILLIGRAM(S): at 03:10

## 2024-11-08 RX ADMIN — Medication 600 MILLIGRAM(S): at 08:48

## 2024-11-08 RX ADMIN — OXYCODONE HYDROCHLORIDE 5 MILLIGRAM(S): 30 TABLET ORAL at 00:12

## 2024-11-08 RX ADMIN — Medication 600 MILLIGRAM(S): at 09:48

## 2024-11-08 RX ADMIN — HEPARIN SODIUM 5000 UNIT(S): 10000 INJECTION INTRAVENOUS; SUBCUTANEOUS at 06:41

## 2024-11-08 RX ADMIN — Medication 600 MILLIGRAM(S): at 15:41

## 2024-11-08 NOTE — PROGRESS NOTE ADULT - ATTENDING COMMENTS
POD3 s/p  section, doing well.  -BPs controlled. Had one elevation but pt states was walking around the room when it was taken. 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 Holding parameters for procardia reviewed as well. Will set up apt with cardio OB however reviewed if they cant get her in this week should fu with us in 1 week for BP check  -Routine postoperative care  -Discharge home   -Instructions given   -Follow-up in office 2 weeks    Soumya Stanton DO

## 2024-11-08 NOTE — PROGRESS NOTE ADULT - SUBJECTIVE AND OBJECTIVE BOX
OB Progress Note:  Delivery, POD#1    S: 35yo POD#1 s/p pLTCS after failed vacuum c/b sPEC. Her pain is well controlled. She is tolerating a regular diet. Tucker recently removed, DTV. Denies N/V. Denies CP/SOB/lightheadedness/dizziness. Pt denies sxs of PEC: denies headache, visual changes, RUQ pain, respiratory distress    O:   Vital Signs Last 24 Hrs  T(C): 36.7 (2024 00:02), Max: 36.8 (2024 05:48)  T(F): 98.1 (2024 00:02), Max: 98.2 (2024 06:15)  HR: 73 (2024 04:) (25 - 92)  BP: 121/75 (2024 04:00) (111/60 - 165/91)  BP(mean): 92 (2024 13:20) (92 - 126)  RR: 18 (2024 04:) (16 - 18)  SpO2: 97% (2024 04:) (80% - 100%)    Parameters below as of 2024 04:00  Patient On (Oxygen Delivery Method): room air        Labs:  Blood type: O Positive  Rubella IgG: RPR: Negative                          10.2[L]   17.72[H] >-----------< 181    (  @ 04:06 )             30.6[L]                        12.5   21.15[H] >-----------< 189    (  @ 08:32 )             37.8                        12.4   10.76[H] >-----------< 209    (  @ 04:28 )             36.9    24 04:06      139  |  105  |  10  ----------------------------<  103[H]  4.1   |  25  |  0.72    24 @ 08:32      138  |  107  |  11  ----------------------------<  107[H]  5.2   |  22  |  0.67        Ca    6.7[L]      2024 04:06  Ca    9.3      2024 08:32  Mg     6.1[H]       Mg     5.7[H]         TPro  5.3[L]  /  Alb  2.8[L]  /  TBili  <0.1[L]  /  DBili  x   /  AST  68[H]  /  ALT  28  /  AlkPhos  128[H]  24 @ 04:06  TPro  5.9[L]  /  Alb  3.0[L]  /  TBili  <0.1[L]  /  DBili  x   /  AST  58[H]  /  ALT  25  /  AlkPhos  150[H]  24 @ 08:32          PE:  General: NAD  Abdomen: Mildly distended, appropriately tender, op site removed, incision with minimal dried blood under Prineo, incision intact  Extremities: No erythema, no pitting edema
Pain Management Attending Addendum    SUBJECTIVE:    Therapy:	  PCA	   Epidural           s/p Spinal Opoid    x          Postpartum infusion	  Patient controlled regional anesthesia (PCRA)    prn Analgesics    OBJECTIVE: No new signs x     Other:    Side Effects:    None	x		 Other:    Assessment of Catheter Site:		 Intact		 Other:    ASSESSMENT/PLAN  Continue current therapy    Therapy changed to:     IV PCA        Epidural      prn Analgesics x    post partum infusion    Comments:
Day 1 of Anesthesia Pain Management Service    SUBJECTIVE:  Pain Scale Score:          [X] Refer to charted pain scores    THERAPY: Received PF spinal morphine as above    OBJECTIVE:    Sedation:        	[X] Alert	[ ] Drowsy	[ ] Arousable      [ ] Asleep       [ ] Unresponsive    Side Effects:	[X] None	[ ] Nausea	[ ] Vomiting         [ ] Pruritus  		[ ] Weakness            [ ] Numbness	          [ ] Other:    ASSESSMENT/ PLAN  [X] Patient transitioned to prn analgesics  [X] Pain management per primary service, pain service to sign off   [X]Documentation and Verification of current medications
Day 1 of Anesthesia Pain Management Service    SUBJECTIVE: Doing ok  Pain Scale Score:          [X] Refer to charted pain scores    THERAPY:  s/p   2  mg PF epidural morphine on 11\6\2024      MEDICATIONS  (STANDING):  acetaminophen     Tablet .. 975 milliGRAM(s) Oral <User Schedule>  acetaminophen   IVPB  1000 milliGRAM(s) IV Intermittent once  diphtheria/tetanus/pertussis (acellular) Vaccine (Adacel) 0.5 milliLiter(s) IntraMuscular once  escitalopram 5 milliGRAM(s) Oral daily  heparin   Injectable 5000 Unit(s) SubCutaneous every 12 hours  ibuprofen  Tablet. 600 milliGRAM(s) Oral every 6 hours  lactated ringers. 1000 milliLiter(s) (125 mL/Hr) IV Continuous <Continuous>  NIFEdipine XL 30 milliGRAM(s) Oral daily  oxytocin Infusion 42 milliUNIT(s)/Min (42 mL/Hr) IV Continuous <Continuous>    MEDICATIONS  (PRN):  diphenhydrAMINE 25 milliGRAM(s) Oral every 6 hours PRN Pruritus  lanolin Ointment 1 Application(s) Topical every 6 hours PRN Sore Nipples  magnesium hydroxide Suspension 30 milliLiter(s) Oral two times a day PRN Constipation  oxyCODONE    IR 5 milliGRAM(s) Oral every 3 hours PRN Moderate to Severe Pain (4-10)  oxyCODONE    IR 5 milliGRAM(s) Oral once PRN Moderate to Severe Pain (4-10)  simethicone 80 milliGRAM(s) Chew every 4 hours PRN Gas      OBJECTIVE:    Sedation:        	[X] Alert	 [ ] Drowsy	[ ] Arousable      [ ] Asleep       [ ] Unresponsive    Side Effects:	[X] None 	[ ] Nausea	[ ] Vomiting         [ ] Pruritus  		[ ] Weakness            [ ] Numbness	          [ ] Other:    Vital Signs Last 24 Hrs  T(C): 36.7 (07 Nov 2024 00:02), Max: 36.7 (07 Nov 2024 00:02)  T(F): 98.1 (07 Nov 2024 00:02), Max: 98.1 (07 Nov 2024 00:02)  HR: 73 (07 Nov 2024 04:00) (56 - 79)  BP: 121/75 (07 Nov 2024 04:00) (112/70 - 151/92)  BP(mean): 92 (06 Nov 2024 13:20) (92 - 113)  RR: 18 (07 Nov 2024 04:00) (18 - 18)  SpO2: 97% (07 Nov 2024 04:00) (80% - 100%)    Parameters below as of 07 Nov 2024 04:00  Patient On (Oxygen Delivery Method): room air        ASSESSMENT/ PLAN  [X] Patient to be transitioned to prn analgesics after 24 hours  [X] Pain management per primary service, pain service to sign off   [X]Documentation and Verification of current medications
OB Progress Note: pLTCS, POD#2    S: 35yo POD#2 s/p pLTCS. Pain is well controlled. She is tolerating a regular diet and passing flatus. She is voiding spontaneously, and ambulating without difficulty. Denies CP/SOB. Denies lightheadedness/dizziness. Denies N/V. Denies sxs od PEC: denies headache, visual changes, RUQ pain, respiratory distress    O:  Vitals:  Vital Signs Last 24 Hrs  T(C): 37 (08 Nov 2024 00:35), Max: 37 (08 Nov 2024 00:35)  T(F): 98.6 (08 Nov 2024 00:35), Max: 98.6 (08 Nov 2024 00:35)  HR: 70 (08 Nov 2024 00:35) (63 - 82)  BP: 127/83 (08 Nov 2024 00:35) (115/75 - 128/84)  BP(mean): --  RR: 18 (08 Nov 2024 00:35) (18 - 18)  SpO2: 96% (08 Nov 2024 00:35) (96% - 98%)    Parameters below as of 08 Nov 2024 00:35  Patient On (Oxygen Delivery Method): room air        MEDICATIONS  (STANDING):  acetaminophen     Tablet .. 975 milliGRAM(s) Oral <User Schedule>  acetaminophen   IVPB .. 1000 milliGRAM(s) IV Intermittent once  diphtheria/tetanus/pertussis (acellular) Vaccine (Adacel) 0.5 milliLiter(s) IntraMuscular once  escitalopram 5 milliGRAM(s) Oral daily  heparin   Injectable 5000 Unit(s) SubCutaneous every 12 hours  ibuprofen  Tablet. 600 milliGRAM(s) Oral every 6 hours  lactated ringers. 1000 milliLiter(s) (125 mL/Hr) IV Continuous <Continuous>  NIFEdipine XL 30 milliGRAM(s) Oral daily  oxytocin Infusion 42 milliUNIT(s)/Min (42 mL/Hr) IV Continuous <Continuous>      MEDICATIONS  (PRN):  diphenhydrAMINE 25 milliGRAM(s) Oral every 6 hours PRN Pruritus  lanolin Ointment 1 Application(s) Topical every 6 hours PRN Sore Nipples  magnesium hydroxide Suspension 30 milliLiter(s) Oral two times a day PRN Constipation  oxyCODONE    IR 5 milliGRAM(s) Oral every 3 hours PRN Moderate to Severe Pain (4-10)  oxyCODONE    IR 5 milliGRAM(s) Oral once PRN Moderate to Severe Pain (4-10)  simethicone 80 milliGRAM(s) Chew every 4 hours PRN Gas      Labs:  Blood type: O Positive  Rubella IgG: RPR: Negative                          10.2[L]   17.72[H] >-----------< 181    ( 11-07 @ 04:06 )             30.6[L]                        12.5   21.15[H] >-----------< 189    ( 11-06 @ 08:32 )             37.8                        12.4   10.76[H] >-----------< 209    ( 11-06 @ 04:28 )             36.9    11-07-24 @ 04:06      139  |  105  |  10  ----------------------------<  103[H]  4.1   |  25  |  0.72    11-06-24 @ 08:32      138  |  107  |  11  ----------------------------<  107[H]  5.2   |  22  |  0.67        Ca    6.7[L]      07 Nov 2024 04:06  Ca    9.3      06 Nov 2024 08:32  Mg     6.1[H]     11-07  Mg     6.1[H]     11-06  Mg     5.7[H]     11-06    TPro  5.3[L]  /  Alb  2.8[L]  /  TBili  <0.1[L]  /  DBili  x   /  AST  68[H]  /  ALT  28  /  AlkPhos  128[H]  11-07-24 @ 04:06  TPro  5.9[L]  /  Alb  3.0[L]  /  TBili  <0.1[L]  /  DBili  x   /  AST  58[H]  /  ALT  25  /  AlkPhos  150[H]  11-06-24 @ 08:32          PE:  General: NAD  Abdomen: Soft, appropriately tender, incision c/d/i.  Extremities: No erythema, no pitting edema

## 2024-11-08 NOTE — PROGRESS NOTE ADULT - ASSESSMENT
35yo POD2 s/p pLTCS under GETA after failed vacuum delivery, c/b sPEC. Patient met criteria for sPEC by severe range BP. Patient is clinically stable and recovering well postoperatively.    #sPEC  - s/p MgSO4 for 24h postpartum for seizure prophylaxis  - s/p Labetalol 20  - On Procardia 30 XL daily  - HELLP labs notable for AST/ALT 58/25->68/26    #Postpartum  - HSQ for DVT ppx  - Tylenol, Motrin, Oxy for pain control  - Regular diet    Janneth Encarnacion, PGY3
33yo POD1 s/p pLTCS under GETA after failed vacuum delivery, c/b sPEC. Patient met criteria for sPEC by severe range BP. Patient is clinically stable and recovering well postoperatively.    #sPEC  - MgSO4 for 24h postpartum for seizure prophylaxis  - s/p Labetalol 20  - On Procardia 30 XL daily  - HELLP labs notable for AST/ALT 58/25  - f/u AM HELLP labs    #Postpartum  - HSQ for DVT ppx  - Tylenol, Motrin, Oxy for pain control  - Regular diet    Janneth Encarnacion, PGY3